# Patient Record
Sex: MALE
[De-identification: names, ages, dates, MRNs, and addresses within clinical notes are randomized per-mention and may not be internally consistent; named-entity substitution may affect disease eponyms.]

---
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
## 2019-03-04 ENCOUNTER — OFFICE VISIT (OUTPATIENT)
Dept: PRIMARY CARE CLINIC | Age: 19
End: 2019-03-04
Payer: COMMERCIAL

## 2019-03-04 VITALS
HEART RATE: 57 BPM | WEIGHT: 248 LBS | HEIGHT: 78 IN | SYSTOLIC BLOOD PRESSURE: 157 MMHG | DIASTOLIC BLOOD PRESSURE: 75 MMHG | BODY MASS INDEX: 28.69 KG/M2

## 2019-03-04 DIAGNOSIS — Z00.00 WELL ADULT EXAM: Primary | ICD-10-CM

## 2019-03-04 DIAGNOSIS — Z23 NEED FOR VACCINATION: ICD-10-CM

## 2019-03-04 DIAGNOSIS — S16.1XXA STRAIN OF NECK MUSCLE, INITIAL ENCOUNTER: ICD-10-CM

## 2019-03-04 DIAGNOSIS — R03.0 ELEVATED BLOOD PRESSURE READING WITHOUT DIAGNOSIS OF HYPERTENSION: ICD-10-CM

## 2019-03-04 PROCEDURE — 99385 PREV VISIT NEW AGE 18-39: CPT | Performed by: FAMILY MEDICINE

## 2019-03-04 SDOH — HEALTH STABILITY: MENTAL HEALTH: HOW MANY STANDARD DRINKS CONTAINING ALCOHOL DO YOU HAVE ON A TYPICAL DAY?: 5 OR 6

## 2019-03-04 SDOH — HEALTH STABILITY: PHYSICAL HEALTH: ON AVERAGE, HOW MANY DAYS PER WEEK DO YOU ENGAGE IN MODERATE TO STRENUOUS EXERCISE (LIKE A BRISK WALK)?: 4 DAYS

## 2019-03-04 SDOH — SOCIAL STABILITY: SOCIAL NETWORK: ARE YOU MARRIED, WIDOWED, DIVORCED, SEPARATED, NEVER MARRIED, OR LIVING WITH A PARTNER?: NEVER MARRIED

## 2019-03-04 SDOH — HEALTH STABILITY: PHYSICAL HEALTH: ON AVERAGE, HOW MANY MINUTES DO YOU ENGAGE IN EXERCISE AT THIS LEVEL?: 90 MIN

## 2019-03-04 SDOH — HEALTH STABILITY: MENTAL HEALTH: HOW OFTEN DO YOU HAVE A DRINK CONTAINING ALCOHOL?: 2-4 TIMES A MONTH

## 2019-03-04 ASSESSMENT — ENCOUNTER SYMPTOMS
COUGH: 0
ABDOMINAL PAIN: 0
RHINORRHEA: 0
CONSTIPATION: 0
VOMITING: 0
COLOR CHANGE: 0
NAUSEA: 0
EYE PAIN: 0
DIARRHEA: 0
SORE THROAT: 0
SHORTNESS OF BREATH: 0

## 2019-05-21 ENCOUNTER — OFFICE VISIT (OUTPATIENT)
Dept: PRIMARY CARE CLINIC | Age: 19
End: 2019-05-21
Payer: COMMERCIAL

## 2019-05-21 VITALS
DIASTOLIC BLOOD PRESSURE: 76 MMHG | HEIGHT: 78 IN | HEART RATE: 63 BPM | BODY MASS INDEX: 26.03 KG/M2 | WEIGHT: 225 LBS | SYSTOLIC BLOOD PRESSURE: 138 MMHG

## 2019-05-21 DIAGNOSIS — R53.83 FATIGUE, UNSPECIFIED TYPE: ICD-10-CM

## 2019-05-21 DIAGNOSIS — R63.4 WEIGHT LOSS: ICD-10-CM

## 2019-05-21 DIAGNOSIS — Z23 NEED FOR VACCINATION: ICD-10-CM

## 2019-05-21 DIAGNOSIS — R03.0 ELEVATED BLOOD PRESSURE READING IN OFFICE WITHOUT DIAGNOSIS OF HYPERTENSION: Primary | ICD-10-CM

## 2019-05-21 LAB
BILIRUBIN, POC: NEGATIVE
BLOOD URINE, POC: NEGATIVE
CLARITY, POC: NORMAL
COLOR, POC: NORMAL
GLUCOSE URINE, POC: NEGATIVE
KETONES, POC: NEGATIVE
LEUKOCYTE EST, POC: NEGATIVE
NITRITE, POC: NEGATIVE
PH, POC: 7
PROTEIN, POC: NEGATIVE
SPECIFIC GRAVITY, POC: 1.02
UROBILINOGEN, POC: 0.2

## 2019-05-21 PROCEDURE — 81002 URINALYSIS NONAUTO W/O SCOPE: CPT | Performed by: FAMILY MEDICINE

## 2019-05-21 PROCEDURE — 99214 OFFICE O/P EST MOD 30 MIN: CPT | Performed by: FAMILY MEDICINE

## 2019-05-21 ASSESSMENT — ENCOUNTER SYMPTOMS
COUGH: 0
CONSTIPATION: 0
ABDOMINAL PAIN: 0
NAUSEA: 0
SHORTNESS OF BREATH: 0
DIARRHEA: 0
VOMITING: 0

## 2019-05-21 ASSESSMENT — PATIENT HEALTH QUESTIONNAIRE - PHQ9
SUM OF ALL RESPONSES TO PHQ QUESTIONS 1-9: 0
2. FEELING DOWN, DEPRESSED OR HOPELESS: 0
1. LITTLE INTEREST OR PLEASURE IN DOING THINGS: 0
SUM OF ALL RESPONSES TO PHQ9 QUESTIONS 1 & 2: 0
SUM OF ALL RESPONSES TO PHQ QUESTIONS 1-9: 0

## 2019-05-21 NOTE — PROGRESS NOTES
Chief Complaint   Patient presents with    Other     Elevated blood pressure reading in office without diagnosis of hypertension    Fatigue    Other     Tightness in throat         HPI:  Dafne Lopez is a 19 y.o. (:2000) here today for multiple medical problems. He has a history of elevated blood pressure reading without diagnosis of hypertension. Chaparro complains of fatigue. Symptoms began several months ago. Sentinal symptom the patient feels fatigue began with: fever. Symptoms of his fatigue have been no other symptoms. He describes the following psychologic symptoms: no stresst that is out of the ordinary. He denies fever, significant change in weight and exercise intolerance. Symptoms have stabilized. Severity has been moderate but easily can carry out all school, work, and family activities. Previous visits for this problem: yes, last seen 2 months ago by me. He denies blood in stool. He says that this stool is brown in color. Review of Systems   Constitutional: Positive for fatigue. Negative for chills and fever. Respiratory: Negative for cough and shortness of breath. Cardiovascular: Negative for chest pain and palpitations. Gastrointestinal: Negative for abdominal pain, constipation, diarrhea, nausea and vomiting. Endocrine: Negative for polyuria. Genitourinary: Negative for dysuria. Neurological: Negative for dizziness and light-headedness. Hematological: Negative for adenopathy. Does not bruise/bleed easily. No past medical history on file.   Family History   Problem Relation Age of Onset    No Known Problems Mother     Hypertension Father     No Known Problems Brother     Other Brother         Heart Murmur     Social History     Socioeconomic History    Marital status: Single     Spouse name: Not on file    Number of children: 0    Years of education: Not on file    Highest education level: Not on file   Occupational History    Occupation: heard.  Pulmonary/Chest: Effort normal and breath sounds normal. He has no wheezes. He has no rales. Abdominal: Soft. Bowel sounds are normal. He exhibits no distension and no mass. There is no tenderness. Lymphadenopathy:     He has no cervical adenopathy. Skin: Skin is warm and dry. No rash noted. No results found for: LABA1C, LABMICR, LDLCALC    ASSESSMENT/PLAN:    1. Elevated blood pressure reading in office without diagnosis of hypertension  Improved: monitor with wt loss. 2. Fatigue, unspecified type  Concerning with wt loss, will check labs for underlying metabolic d/os  - Lipid Panel; Future  - Comprehensive Metabolic Panel; Future  - CBC Auto Differential; Future  - TSH with Reflex; Future  - Lilliam-Fry virus VCA, IgG; Future  - Lilliam-Barr virus nuclear antigen antibody, IgG; Future  - Lilliam-Barr virus early antigen antibody, IgG; Future    3. Need for vaccination  vaccinate  - HPV Vaccine 9-valent IM    4. Weight loss  Intentional but exceeds expectations, check labs. - Comprehensive Metabolic Panel; Future  - CBC Auto Differential; Future  - TSH with Reflex; Future  - POCT Urinalysis no Micro      RTC pending results. Scribe attestation:  Gladis Velázquez MA, am scribing for and in the presence of Torito Loza MD. Electronically signed by Rut Liu MA on 5/21/2019 at 2:18 PM            Provider attestation: Hilario Maciel MD, personally performed the services scribed by the user listed above in my presence, and it is both accurate and complete. I agree with the ROS and Past Histories independently gathered by the clinical support staff and the remaining scribed note accurately describes my personal service to the patient.     [facility]    5/21/2019  2:58 PM

## 2019-05-22 PROCEDURE — 90471 IMMUNIZATION ADMIN: CPT | Performed by: FAMILY MEDICINE

## 2019-05-22 PROCEDURE — 90651 9VHPV VACCINE 2/3 DOSE IM: CPT | Performed by: FAMILY MEDICINE

## 2019-06-06 DIAGNOSIS — R53.83 FATIGUE, UNSPECIFIED TYPE: ICD-10-CM

## 2019-06-06 DIAGNOSIS — R63.4 WEIGHT LOSS: ICD-10-CM

## 2019-06-07 LAB
A/G RATIO: 2.6 (ref 1.1–2.2)
ALBUMIN SERPL-MCNC: 5.1 G/DL (ref 3.4–5)
ALP BLD-CCNC: 62 U/L (ref 40–129)
ALT SERPL-CCNC: 33 U/L (ref 10–40)
ANION GAP SERPL CALCULATED.3IONS-SCNC: 15 MMOL/L (ref 3–16)
AST SERPL-CCNC: 21 U/L (ref 15–37)
BASOPHILS ABSOLUTE: 0 K/UL (ref 0–0.2)
BASOPHILS RELATIVE PERCENT: 0.6 %
BILIRUB SERPL-MCNC: 0.5 MG/DL (ref 0–1)
BUN BLDV-MCNC: 13 MG/DL (ref 7–20)
CALCIUM SERPL-MCNC: 9.7 MG/DL (ref 8.3–10.6)
CHLORIDE BLD-SCNC: 103 MMOL/L (ref 99–110)
CHOLESTEROL, TOTAL: 122 MG/DL (ref 0–199)
CO2: 25 MMOL/L (ref 21–32)
CREAT SERPL-MCNC: 0.9 MG/DL (ref 0.9–1.3)
EOSINOPHILS ABSOLUTE: 0.3 K/UL (ref 0–0.6)
EOSINOPHILS RELATIVE PERCENT: 4.2 %
GFR AFRICAN AMERICAN: >60
GFR NON-AFRICAN AMERICAN: >60
GLOBULIN: 2 G/DL
GLUCOSE BLD-MCNC: 96 MG/DL (ref 70–99)
HCT VFR BLD CALC: 44.1 % (ref 40.5–52.5)
HDLC SERPL-MCNC: 48 MG/DL (ref 40–60)
HEMOGLOBIN: 14.7 G/DL (ref 13.5–17.5)
LDL CHOLESTEROL CALCULATED: 50 MG/DL
LYMPHOCYTES ABSOLUTE: 2.1 K/UL (ref 1–5.1)
LYMPHOCYTES RELATIVE PERCENT: 32.1 %
MCH RBC QN AUTO: 29.4 PG (ref 26–34)
MCHC RBC AUTO-ENTMCNC: 33.4 G/DL (ref 31–36)
MCV RBC AUTO: 88.1 FL (ref 80–100)
MONOCYTES ABSOLUTE: 0.4 K/UL (ref 0–1.3)
MONOCYTES RELATIVE PERCENT: 6.5 %
NEUTROPHILS ABSOLUTE: 3.6 K/UL (ref 1.7–7.7)
NEUTROPHILS RELATIVE PERCENT: 56.6 %
PDW BLD-RTO: 14 % (ref 12.4–15.4)
PLATELET # BLD: 185 K/UL (ref 135–450)
PMV BLD AUTO: 10.9 FL (ref 5–10.5)
POTASSIUM SERPL-SCNC: 4.7 MMOL/L (ref 3.5–5.1)
RBC # BLD: 5.01 M/UL (ref 4.2–5.9)
SODIUM BLD-SCNC: 143 MMOL/L (ref 136–145)
TOTAL PROTEIN: 7.1 G/DL (ref 6.4–8.2)
TRIGL SERPL-MCNC: 122 MG/DL (ref 0–150)
TSH REFLEX: 2.08 UIU/ML (ref 0.27–4.2)
VLDLC SERPL CALC-MCNC: 24 MG/DL
WBC # BLD: 6.4 K/UL (ref 4–11)

## 2019-06-08 LAB
EPSTEIN BARR VIRUS NUCLEAR AB IGG: <3 U/ML (ref 0–21.9)
EPSTEIN-BARR VCA IGG: <10 U/ML (ref 0–21.9)

## 2020-10-12 ENCOUNTER — HOSPITAL ENCOUNTER (OUTPATIENT)
Dept: GENERAL RADIOLOGY | Age: 20
Discharge: HOME | End: 2020-10-12
Payer: COMMERCIAL

## 2020-10-12 ENCOUNTER — OFFICE VISIT (OUTPATIENT)
Dept: PRIMARY CARE CLINIC | Age: 20
End: 2020-10-12
Payer: COMMERCIAL

## 2020-10-12 ENCOUNTER — HOSPITAL ENCOUNTER (OUTPATIENT)
Age: 20
Discharge: HOME | End: 2020-10-12
Payer: COMMERCIAL

## 2020-10-12 VITALS
BODY MASS INDEX: 27.6 KG/M2 | WEIGHT: 238.8 LBS | SYSTOLIC BLOOD PRESSURE: 126 MMHG | TEMPERATURE: 93.7 F | HEART RATE: 58 BPM | DIASTOLIC BLOOD PRESSURE: 68 MMHG

## 2020-10-12 PROCEDURE — 73552 X-RAY EXAM OF FEMUR 2/>: CPT

## 2020-10-12 PROCEDURE — 99213 OFFICE O/P EST LOW 20 MIN: CPT | Performed by: FAMILY MEDICINE

## 2020-10-12 ASSESSMENT — ENCOUNTER SYMPTOMS
NAUSEA: 0
CONSTIPATION: 0
ABDOMINAL PAIN: 0
SHORTNESS OF BREATH: 0
VOMITING: 0
DIARRHEA: 0
COUGH: 0

## 2020-10-12 ASSESSMENT — PATIENT HEALTH QUESTIONNAIRE - PHQ9
SUM OF ALL RESPONSES TO PHQ9 QUESTIONS 1 & 2: 0
SUM OF ALL RESPONSES TO PHQ QUESTIONS 1-9: 0
2. FEELING DOWN, DEPRESSED OR HOPELESS: 0
1. LITTLE INTEREST OR PLEASURE IN DOING THINGS: 0
SUM OF ALL RESPONSES TO PHQ QUESTIONS 1-9: 0

## 2020-10-12 NOTE — PROGRESS NOTES
Chief Complaint   Patient presents with    Leg Pain     right thigh, x 1 month       HPI: Kuldip Jefferson presents for evaluation and management of 1 month history of dull aching and throbbing distal right femur pain. He is not able associated with an acute injury but he had been lifting a lot of weights. He notes no bruising and no other symptoms of illness. Denies fever chills easy bleeding or bruising      Review of Systems   Constitutional: Negative for chills and fever. Respiratory: Negative for cough and shortness of breath. Cardiovascular: Negative for chest pain and palpitations. Gastrointestinal: Negative for abdominal pain, constipation, diarrhea, nausea and vomiting. Endocrine: Negative for polyuria. Genitourinary: Negative for dysuria. No Known Allergies  New Prescriptions    No medications on file     No current outpatient medications on file. No current facility-administered medications for this visit. History reviewed. No pertinent past medical history. Objective   /68   Pulse 58   Temp 93.7 °F (34.3 °C) (Temporal)   Wt 238 lb 12.8 oz (108.3 kg)   BMI 27.60 kg/m²   Wt Readings from Last 3 Encounters:   10/12/20 238 lb 12.8 oz (108.3 kg)   05/21/19 225 lb (102.1 kg)   03/04/19 248 lb (112.5 kg)       Physical Exam  Constitutional:       Appearance: He is well-developed. Cardiovascular:      Rate and Rhythm: Normal rate and regular rhythm. Heart sounds: No murmur. No friction rub. No gallop. Pulmonary:      Effort: Pulmonary effort is normal.      Breath sounds: Normal breath sounds. No wheezing or rales. Abdominal:      General: There is no distension. Palpations: Abdomen is soft. Musculoskeletal:      Comments: No bony tenderness or swelling noted of right femur   Skin:     General: Skin is warm and dry. Findings: No rash.            Chemistry        Component Value Date/Time     06/06/2019 1158    K 4.7 06/06/2019 1158     06/06/2019 1158    CO2 25 06/06/2019 1158    BUN 13 06/06/2019 1158    CREATININE 0.9 06/06/2019 1158        Component Value Date/Time    CALCIUM 9.7 06/06/2019 1158    ALKPHOS 62 06/06/2019 1158    AST 21 06/06/2019 1158    ALT 33 06/06/2019 1158    BILITOT 0.5 06/06/2019 1158          Lab Results   Component Value Date    WBC 6.4 06/06/2019    HGB 14.7 06/06/2019    HCT 44.1 06/06/2019    MCV 88.1 06/06/2019     06/06/2019     No results found for: LABA1C  No results found for: EAG  No results found for: LABA1C  No components found for: CHLPL  Lab Results   Component Value Date    TRIG 122 06/06/2019     Lab Results   Component Value Date    HDL 48 06/06/2019     Lab Results   Component Value Date    LDLCALC 50 06/06/2019     Lab Results   Component Value Date    LABVLDL 24 06/06/2019         Assessment   Plan     1. Bone pain  Concerning for underlying bone pathology. We will start with an x-ray if this is abnormal we will obtain an MRI. And laboratory w/u for malignancy (CMP, ESR, CBC, SPEP, Light Chain)  - XR FEMUR RIGHT (MIN 2 VIEWS); Future    Discussed use, benefit, and side effects of prescribed medications. Barriers to medication compliance addressed. All patient questions answered. Pt voiced understanding.        Health Maintenance   Topic Date Due    DTaP/Tdap/Td vaccine (6 - Tdap) 07/24/2009    HIV screen  07/24/2013    HPV vaccine (3 - Male 3-dose series) 09/22/2019    Flu vaccine (1) 09/01/2020    Hepatitis A vaccine  Completed    Hepatitis B vaccine  Completed    Hib vaccine  Completed    Varicella vaccine  Completed    Meningococcal (ACWY) vaccine  Completed    Pneumococcal 0-64 years Vaccine  Completed       RTC 2 weeks and as needed

## 2021-07-08 ENCOUNTER — OFFICE VISIT (OUTPATIENT)
Dept: PRIMARY CARE CLINIC | Age: 21
End: 2021-07-08
Payer: COMMERCIAL

## 2021-07-08 VITALS
HEART RATE: 62 BPM | HEIGHT: 78 IN | WEIGHT: 227.6 LBS | BODY MASS INDEX: 26.33 KG/M2 | SYSTOLIC BLOOD PRESSURE: 120 MMHG | DIASTOLIC BLOOD PRESSURE: 71 MMHG

## 2021-07-08 DIAGNOSIS — D48.5 NEOPLASM OF UNCERTAIN BEHAVIOR OF SKIN: ICD-10-CM

## 2021-07-08 DIAGNOSIS — M54.9 MID BACK PAIN ON LEFT SIDE: Primary | ICD-10-CM

## 2021-07-08 PROCEDURE — 99214 OFFICE O/P EST MOD 30 MIN: CPT | Performed by: FAMILY MEDICINE

## 2021-07-08 RX ORDER — CYCLOBENZAPRINE HCL 10 MG
10 TABLET ORAL 3 TIMES DAILY PRN
Qty: 20 TABLET | Refills: 0 | Status: SHIPPED | OUTPATIENT
Start: 2021-07-08 | End: 2021-07-18

## 2021-07-08 RX ORDER — METHYLPREDNISOLONE 4 MG/1
TABLET ORAL
Qty: 1 KIT | Refills: 0 | Status: SHIPPED | OUTPATIENT
Start: 2021-07-08 | End: 2021-12-21

## 2021-07-08 ASSESSMENT — PATIENT HEALTH QUESTIONNAIRE - PHQ9
SUM OF ALL RESPONSES TO PHQ QUESTIONS 1-9: 0
1. LITTLE INTEREST OR PLEASURE IN DOING THINGS: 0
SUM OF ALL RESPONSES TO PHQ9 QUESTIONS 1 & 2: 0
2. FEELING DOWN, DEPRESSED OR HOPELESS: 0
SUM OF ALL RESPONSES TO PHQ QUESTIONS 1-9: 0
SUM OF ALL RESPONSES TO PHQ QUESTIONS 1-9: 0

## 2021-07-08 NOTE — PATIENT INSTRUCTIONS
to 12 times. Resisted shoulder blade squeeze   For this exercise, you will need elastic exercise material, such as surgical tubing or Thera-Band. 1. Sit or stand, holding the band in both hands in front of you. Keep your elbows close to your sides, bent at a 90-degree angle. Your palms should face up. 2. Squeeze your shoulder blades together, and move your arms to the outside, stretching the band. Be sure to keep your elbows at your sides while you do this. 3. Relax. 4. Repeat 8 to 12 times. Resisted rows   For this exercise, you will need elastic exercise material, such as surgical tubing or Thera-Band. 1. Put the band around a solid object, such as a bedpost, at about waist level. Hold one end of the band in each hand. 2. With your elbows at your sides and bent to 90 degrees, pull the band back to move your shoulder blades toward each other. Return to the starting position. 3. Repeat 8 to 12 times. Follow-up care is a key part of your treatment and safety. Be sure to make and go to all appointments, and call your doctor if you are having problems. It's also a good idea to know your test results and keep a list of the medicines you take. Where can you learn more? Go to https://[employer]. org and sign in to your Medicine in Practice account. Enter V588 in the [facility] box to learn more about \"Healthy Upper Back: Exercises. \"     If you do not have an account, please click on the \"Sign Up Now\" link. Current as of: November 16, 2020               Content Version: 12.9  © 8554-6424 [employer]wise, Incorporated. Care instructions adapted under license by [facility] ([facility]). If you have questions about a medical condition or this instruction, always ask your healthcare professional. [address] any warranty or liability for your use of this information.

## 2021-07-08 NOTE — PROGRESS NOTES
Chief Complaint   Patient presents with    Back Pain     Upper back        HPI: Miriam Wills presents for evaluation and management of 2-week history of mid left back pain. Patient denies any antecedent history of injury. He has tried resting it and icing it and massage without any benefit. He also notes a 1 year history of pigmented lesion on the left side of his back that he thinks is gradually enlarging. His parents have asked him to have that evaluated as well. Today's PHQ:    PHQ Scores 7/8/2021 10/12/2020 5/21/2019   PHQ2 Score 0 0 0   PHQ9 Score 0 0 0     Interpretation of Total Score Depression Severity: 1-4 = Minimal depression, 5-9 = Mild depression, 10-14 = Moderate depression, 15-19 = Moderately severe depression, 20-27 = Severe depression        Review of Systems    No Known Allergies  New Prescriptions    CYCLOBENZAPRINE (FLEXERIL) 10 MG TABLET    Take 1 tablet by mouth 3 times daily as needed for Muscle spasms best if taken one hour before bedtime then Ice your lower back for 20 min    METHYLPREDNISOLONE (MEDROL DOSEPACK) 4 MG TABLET    Take by mouth. Current Outpatient Medications   Medication Sig Dispense Refill    cyclobenzaprine (FLEXERIL) 10 MG tablet Take 1 tablet by mouth 3 times daily as needed for Muscle spasms best if taken one hour before bedtime then Ice your lower back for 20 min 20 tablet 0    methylPREDNISolone (MEDROL DOSEPACK) 4 MG tablet Take by mouth. 1 kit 0     No current facility-administered medications for this visit. No past medical history on file. Objective   /71 (Site: Left Upper Arm, Position: Sitting, Cuff Size: Large Adult)   Pulse 62   Ht 6' 6\" (1.981 m)   Wt 227 lb 9.6 oz (103.2 kg)   BMI 26.30 kg/m²   Wt Readings from Last 3 Encounters:   07/08/21 227 lb 9.6 oz (103.2 kg)   10/12/20 238 lb 12.8 oz (108.3 kg)   05/21/19 225 lb (102.1 kg)       Physical Exam  Constitutional:       Appearance: He is well-developed.    Cardiovascular:      Rate and Rhythm: Normal rate and regular rhythm. Heart sounds: No murmur heard. No friction rub. No gallop. Pulmonary:      Effort: Pulmonary effort is normal.      Breath sounds: Normal breath sounds. No wheezing or rales. Abdominal:      General: Bowel sounds are normal. There is no distension. Palpations: Abdomen is soft. There is no mass. Tenderness: There is no abdominal tenderness. Musculoskeletal:      Comments: Mild left paraspinal tenderness and spasm of mid thoracic back   Skin:     General: Skin is warm and dry. Findings: No rash. Comments: Irregular light brown pigmented lesion in left mid back. Chemistry        Component Value Date/Time     06/06/2019 1158    K 4.7 06/06/2019 1158     06/06/2019 1158    CO2 25 06/06/2019 1158    BUN 13 06/06/2019 1158    CREATININE 0.9 06/06/2019 1158        Component Value Date/Time    CALCIUM 9.7 06/06/2019 1158    ALKPHOS 62 06/06/2019 1158    AST 21 06/06/2019 1158    ALT 33 06/06/2019 1158    BILITOT 0.5 06/06/2019 1158          Lab Results   Component Value Date    WBC 6.4 06/06/2019    HGB 14.7 06/06/2019    HCT 44.1 06/06/2019    MCV 88.1 06/06/2019     06/06/2019     No results found for: LABA1C  No results found for: EAG  No results found for: LABA1C  No components found for: CHLPL  Lab Results   Component Value Date    TRIG 122 06/06/2019     Lab Results   Component Value Date    HDL 48 06/06/2019     Lab Results   Component Value Date    LDLCALC 50 06/06/2019     Lab Results   Component Value Date    LABVLDL 24 06/06/2019         Assessment   Plan   1. Mid back pain on left side  Gave patient steroids and muscle relaxants and a home exercise program we will recheck this in 1 month-     cyclobenzaprine (FLEXERIL) 10 MG tablet;  Take 1 tablet by mouth 3 times daily as needed for Muscle spasms best if taken one hour before bedtime then Ice your lower back for 20 min, Disp-20 tablet, R-0Normal  - methylPREDNISolone (MEDROL DOSEPACK) 4 MG tablet; Take by mouth., Disp-1 kit, R-0Normal  2. Neoplasm of uncertain behavior of skin  Consult to Dr. Cholo Elizondo for evaluation.  -     Kosta Lujan MD, Dermatology, [facility]     Discussed use, benefit, and side effects of prescribed medications. Barriers to medication compliance addressed. All patient questions answered. Pt voiced understanding. RTC Return in about 4 weeks (around 8/5/2021).

## 2021-07-08 NOTE — Clinical Note
Dr. Paula Patricia,   Would you mind taking a look at a skin lesion on Chaparro's L mid back for me? It is brown and flat but irregular and he is worried and I can't give him a specific diagnosis. Thanks,  Angelene Lennox told him your office would call him.

## 2021-07-09 ENCOUNTER — TELEPHONE (OUTPATIENT)
Dept: DERMATOLOGY | Age: 21
End: 2021-07-09

## 2021-07-09 ENCOUNTER — OFFICE VISIT (OUTPATIENT)
Dept: DERMATOLOGY | Age: 21
End: 2021-07-09
Payer: COMMERCIAL

## 2021-07-09 VITALS — TEMPERATURE: 98.1 F

## 2021-07-09 DIAGNOSIS — D22.5: Primary | ICD-10-CM

## 2021-07-09 DIAGNOSIS — L90.5 SCAR CONDITION AND FIBROSIS OF SKIN: ICD-10-CM

## 2021-07-09 DIAGNOSIS — D22.9 MULTIPLE BENIGN MELANOCYTIC NEVI: ICD-10-CM

## 2021-07-09 PROCEDURE — 99243 OFF/OP CNSLTJ NEW/EST LOW 30: CPT | Performed by: DERMATOLOGY

## 2021-07-09 NOTE — Clinical Note
Dear Dr. Ed Solis evaluated Mukesh Cruz who is a very nice gentleman. I reassured him the lesion is called Holman's nevus (benign hamartoma). In his case it is also associated with a smooth muscle hamartoma. No treatment is necessary.     Thank you so much for the referral!    Aliyah Loyd

## 2021-07-09 NOTE — PROGRESS NOTES
Patient's Name: Mukesh Cruz  MRN: <[id number]>  YOB: 2000  Date of Visit: 7/9/2021  Primary Care Provider: Samreen Butcher MD  Referring Provider: Samreen Butcher MD    Subjective:     Chief Complaint   Patient presents with   [de-identified] Mole     left mid back        History of Present Illness:  Mukesh Cruz an 23 y.o. male who presents in clinic today as a new patient seen in consultation request by Samreen Butcher MD   for a  skin examination and evaluation of a mole . He is concerned about the following spot/s that he would like checked:    What is it: Mole  Signs and symptoms: irritated  Location: Left back  . Duration: This lesion has been present for (unsure how long but was noted by his family ~4 months ago)  Current treatment: None  He is being treated for back pain by his PCP (medrol dose pack and NSAIDs)        Past Dermatologic History:  Personal history of non melanoma skin cancer: No   Personal history of melanoma: No  Personal history of abnormal/dysplastic moles: No  Personal history of tanning bed use current: No  Personal history of tanning bed use: No  Personal history of blistering sunburns: No  Personal history of extensive sun exposure: Yes  Burns easily: Yes  Practicing sun protective habits:  No     Social History:   Occupation Current or Former: full time job as    Marital status: single  Smoking Status: Never smoker  Children: No   Type of outdoor activities if any : Running, hiking, fishing      Family Skin Disease History:  Patient denies  a family history of non melanoma skin cancer. Patient denies  a family history of abnormal moles  Patient denies  an immediate family history of melanoma. Past Medical History:  History reviewed. No pertinent past medical history.     Past Surgical History:  Past Surgical History:   Procedure Laterality Date    HUMERUS FRACTURE SURGERY Right     2 Times    WISDOM TOOTH EXTRACTION  2018       Past Family History:  Family History   Problem Relation Age of Onset    No Known Problems Mother     Hypertension Father     No Known Problems Brother     Other Brother         Heart Murmur       Allergies:  No Known Allergies    Current Medications:  Current Outpatient Medications   Medication Sig Dispense Refill    cyclobenzaprine (FLEXERIL) 10 MG tablet Take 1 tablet by mouth 3 times daily as needed for Muscle spasms best if taken one hour before bedtime then Ice your lower back for 20 min 20 tablet 0    methylPREDNISolone (MEDROL DOSEPACK) 4 MG tablet Take by mouth. 1 kit 0     No current facility-administered medications for this visit. Review of Systems:  Constitutional: No fevers, chills or recent illness. Skin: Skin:As per HPI AND otherwise no new, bleeding or symptomatic skin lesions      Objective:     Vitals:    07/09/21 1239   Temp: 98.1 °F (36.7 °C)   TempSrc: Infrared     Physical Examination:  General: alert, comfortable, no apparent distress, well-appearing  Psych: alert, oriented and pleasant  Neuro: oriented to person, place, and time  Skin: Areas examined: head including face, lips, conjunctiva and lids, neck, hair/scalp, chest, including breasts and axilla, abdomen, back, right upper extremity, left upper extremity, left hand, right hand and digits and nails      All areas examined were within normal limits except those listed below with the appropriate assessment and plan    Assessment and Plan (with relevant objective exam findings):     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 This typically presents as a large, hyperpigmented patch with increased hair growth on the upper trunk in men.  Lesions are usually present before puberty but often have increased coarse hair growth and a slightly raised texture during adolescence due to androgen stimulation. Acne may develop in the lesions. Treatment is only required in rare cases for severe cosmetic or psychosocial reasons. Reassurance provided and no active intervention at this time    2. Multiple benign melanocytic nevi   Location:torso and extremities  Objective findings: multiple brown/pink macules and papules without abnormal findings or concerning findings on exam and dermatoscopy    -Counseled the patient that these are benign and need no therapy. Observation for any changes recommended       3. Scar conditions and fibrosis of skin  Location: Rt upper arm  Objective findings: hypertrophic scar    Counseled the patient that this is benign and needs no therapy. Observation for any changes recommended. Risk factors for conditions such as nevi, and skin damage including genetics and UV radiation from the sun was discussed. Recommended sun avoidance, use of protective clothing and broad spectrum sunscreen containing avobenzone, titanium dioxide, or zinc oxide with SPF 50 or higher and yearly full skin exams with a dermatologist.  Encouraged self skin checks each month and return to clinic if any new, changing, or concerning spots are identified.  - Written material on sunscreen provided. Follow up:  Return visit as needed for change in condition. All questions addressed. Procedure:   No procedure performed                I saw your patient Daren Mcginnis at the date listed above in my Dermatology  clinic in [facility]. Thank you very much for the referral.    My exam findings, assessment and plan can be found in EPIC, I have also attached them below for your convenience. I very much appreciate your referral and the opportunity to participate in this patient's care. Please don't hesitate to contact me with questions or concerns about our visit or management of this patient in the future.      Karen Guthrie MD, MS

## 2021-07-09 NOTE — TELEPHONE ENCOUNTER
Patient is returning Percy's call re appt for 12:40 today. Patient states he can make that appt please call to confirm and schedule. Please advise. Thank you!

## 2021-07-11 PROBLEM — D22.9 MULTIPLE BENIGN MELANOCYTIC NEVI: Status: ACTIVE | Noted: 2021-07-11

## 2021-07-11 PROBLEM — D22.5: Status: ACTIVE | Noted: 2021-07-11

## 2021-07-12 NOTE — PATIENT INSTRUCTIONS
A Holman nevus is a late-onset birthmark occurring mostly in males. It is also known as Holman melanosis. It is due to an overgrowth of the epidermis (upper layers of the skin), pigment cells (melanocytes) and hair follicles. It develops during childhood or adolescence on the shoulders or upper trunk, occasionally elsewhere. It may be triggered to develop by circulating androgens (male hormones such as testosterone), which is why it appears in males at puberty. Features of Holman nevus  A Holman nevus is a large one-sided brown patch, sometimes over half the upper back or chest. After puberty it often becomes darker and quite hairy, a feature also called hypertrichosis. Occasionally acne may develop in the nevus. Treatment with lasers if treatment is requested for for the hair overgrowth or dark color.

## 2021-12-21 ENCOUNTER — OFFICE VISIT (OUTPATIENT)
Dept: PRIMARY CARE CLINIC | Age: 21
End: 2021-12-21
Payer: COMMERCIAL

## 2021-12-21 VITALS
SYSTOLIC BLOOD PRESSURE: 152 MMHG | BODY MASS INDEX: 29.32 KG/M2 | TEMPERATURE: 97.5 F | WEIGHT: 253.4 LBS | HEIGHT: 78 IN | DIASTOLIC BLOOD PRESSURE: 60 MMHG

## 2021-12-21 DIAGNOSIS — R03.0 ELEVATED BLOOD PRESSURE READING IN OFFICE WITHOUT DIAGNOSIS OF HYPERTENSION: Primary | ICD-10-CM

## 2021-12-21 DIAGNOSIS — S39.012A STRAIN OF LUMBAR REGION, INITIAL ENCOUNTER: ICD-10-CM

## 2021-12-21 PROCEDURE — 99214 OFFICE O/P EST MOD 30 MIN: CPT | Performed by: FAMILY MEDICINE

## 2021-12-21 RX ORDER — METHYLPREDNISOLONE 4 MG/1
TABLET ORAL
Qty: 1 KIT | Refills: 0 | Status: SHIPPED | OUTPATIENT
Start: 2021-12-21 | End: 2022-03-21 | Stop reason: ALTCHOICE

## 2021-12-21 RX ORDER — CYCLOBENZAPRINE HCL 10 MG
10 TABLET ORAL 3 TIMES DAILY PRN
Qty: 20 TABLET | Refills: 0 | Status: SHIPPED | OUTPATIENT
Start: 2021-12-21 | End: 2021-12-31

## 2021-12-21 ASSESSMENT — PATIENT HEALTH QUESTIONNAIRE - PHQ9
1. LITTLE INTEREST OR PLEASURE IN DOING THINGS: 0
SUM OF ALL RESPONSES TO PHQ9 QUESTIONS 1 & 2: 0
SUM OF ALL RESPONSES TO PHQ QUESTIONS 1-9: 0
2. FEELING DOWN, DEPRESSED OR HOPELESS: 0

## 2021-12-21 NOTE — PATIENT INSTRUCTIONS
Patient Education        DASH Diet: Care Instructions  Your Care Instructions     The DASH diet is an eating plan that can help lower your blood pressure. DASH stands for Dietary Approaches to Stop Hypertension. Hypertension is high blood pressure. The DASH diet focuses on eating foods that are high in calcium, potassium, and magnesium. These nutrients can lower blood pressure. The foods that are highest in these nutrients are fruits, vegetables, low-fat dairy products, nuts, seeds, and legumes. But taking calcium, potassium, and magnesium supplements instead of eating foods that are high in those nutrients does not have the same effect. The DASH diet also includes whole grains, fish, and poultry. The DASH diet is one of several lifestyle changes your doctor may recommend to lower your high blood pressure. Your doctor may also want you to decrease the amount of sodium in your diet. Lowering sodium while following the DASH diet can lower blood pressure even further than just the DASH diet alone. Follow-up care is a key part of your treatment and safety. Be sure to make and go to all appointments, and call your doctor if you are having problems. It's also a good idea to know your test results and keep a list of the medicines you take. How can you care for yourself at home? Following the DASH diet  · Eat 4 to 5 servings of fruit each day. A serving is 1 medium-sized piece of fruit, ½ cup chopped or canned fruit, 1/4 cup dried fruit, or 4 ounces (½ cup) of fruit juice. Choose fruit more often than fruit juice. · Eat 4 to 5 servings of vegetables each day. A serving is 1 cup of lettuce or raw leafy vegetables, ½ cup of chopped or cooked vegetables, or 4 ounces (½ cup) of vegetable juice. Choose vegetables more often than vegetable juice. · Get 2 to 3 servings of low-fat and fat-free dairy each day. A serving is 8 ounces of milk, 1 cup of yogurt, or 1 ½ ounces of cheese. · Eat 6 to 8 servings of grains each day. A serving is 1 slice of bread, 1 ounce of dry cereal, or ½ cup of cooked rice, pasta, or cooked cereal. Try to choose whole-grain products as much as possible. · Limit lean meat, poultry, and fish to 2 servings each day. A serving is 3 ounces, about the size of a deck of cards. · Eat 4 to 5 servings of nuts, seeds, and legumes (cooked dried beans, lentils, and split peas) each week. A serving is 1/3 cup of nuts, 2 tablespoons of seeds, or ½ cup of cooked beans or peas. · Limit fats and oils to 2 to 3 servings each day. A serving is 1 teaspoon of vegetable oil or 2 tablespoons of salad dressing. · Limit sweets and added sugars to 5 servings or less a week. A serving is 1 tablespoon jelly or jam, ½ cup sorbet, or 1 cup of lemonade. · Eat less than 2,300 milligrams (mg) of sodium a day. If you limit your sodium to 1,500 mg a day, you can lower your blood pressure even more. · Be aware that all of these are the suggested number of servings for people who eat 1,800 to 2,000 calories a day. Your recommended number of servings may be different if you need more or fewer calories. Tips for success  · Start small. Do not try to make dramatic changes to your diet all at once. You might feel that you are missing out on your favorite foods and then be more likely to not follow the plan. Make small changes, and stick with them. Once those changes become habit, add a few more changes. · Try some of the following:  ? Make it a goal to eat a fruit or vegetable at every meal and at snacks. This will make it easy to get the recommended amount of fruits and vegetables each day. ? Try yogurt topped with fruit and nuts for a snack or healthy dessert. ? Add lettuce, tomato, cucumber, and onion to sandwiches. ? Combine a ready-made pizza crust with low-fat mozzarella cheese and lots of vegetable toppings. Try using tomatoes, squash, spinach, broccoli, carrots, cauliflower, and onions. ?  Have a variety of cut-up vegetables with a low-fat dip as an appetizer instead of chips and dip. ? Sprinkle sunflower seeds or chopped almonds over salads. Or try adding chopped walnuts or almonds to cooked vegetables. ? Try some vegetarian meals using beans and peas. Add garbanzo or kidney beans to salads. Make burritos and tacos with mashed amato beans or black beans. Where can you learn more? Go to https://FORMTEK."[employer]. org and sign in to your [employer] account. Enter M648 in the [employer] box to learn more about \"DASH Diet: Care Instructions. \"     If you do not have an account, please click on the \"Sign Up Now\" link. Current as of: April 29, 2021               Content Version: 13.0  © 7488-6290 [employer]. Care instructions adapted under license by [facility] ([facility]). If you have questions about a medical condition or this instruction, always ask your healthcare professional. [place] [address] any warranty or liability for your use of this information. Patient Education        Low Back Pain: Exercises  Introduction  Here are some examples of exercises for you to try. The exercises may be suggested for a condition or for rehabilitation. Start each exercise slowly. Ease off the exercises if you start to have pain. You will be told when to start these exercises and which ones will work best for you. How to do the exercises  Press-up    1. Lie on your stomach, supporting your body with your forearms. 2. Press your elbows down into the floor to raise your upper back. As you do this, relax your stomach muscles and allow your back to arch without using your back muscles. As your press up, do not let your hips or pelvis come off the floor. 3. Hold for 15 to 30 seconds, then relax. 4. Repeat 2 to 4 times. Alternate arm and leg (bird dog) exercise    Do this exercise slowly. Try to keep your body straight at all times, and do not let one hip drop lower than the other.   1. Start on the floor, on your hands and knees. 2. Tighten your belly muscles. 3. Raise one leg off the floor, and hold it straight out behind you. Be careful not to let your hip drop down, because that will twist your trunk. 4. Hold for about 6 seconds, then lower your leg and switch to the other leg. 5. Repeat 8 to 12 times on each leg. 6. Over time, work up to holding for 10 to 30 seconds each time. 7. If you feel stable and secure with your leg raised, try raising the opposite arm straight out in front of you at the same time. Knee-to-chest exercise    1. Lie on your back with your knees bent and your feet flat on the floor. 2. Bring one knee to your chest, keeping the other foot flat on the floor (or keeping the other leg straight, whichever feels better on your lower back). 3. Keep your lower back pressed to the floor. Hold for at least 15 to 30 seconds. 4. Relax, and lower the knee to the starting position. 5. Repeat with the other leg. Repeat 2 to 4 times with each leg. 6. To get more stretch, put your other leg flat on the floor while pulling your knee to your chest.  Curl-ups    1. Lie on the floor on your back with your knees bent at a 90-degree angle. Your feet should be flat on the floor, about 12 inches from your buttocks. 2. Cross your arms over your chest. If this bothers your neck, try putting your hands behind your neck (not your head), with your elbows spread apart. 3. Slowly tighten your belly muscles and raise your shoulder blades off the floor. 4. Keep your head in line with your body, and do not press your chin to your chest.  5. Hold this position for 1 or 2 seconds, then slowly lower yourself back down to the floor. 6. Repeat 8 to 12 times. Pelvic tilt exercise    1. Lie on your back with your knees bent. 2. \"Brace\" your stomach. This means to tighten your muscles by pulling in and imagining your belly button moving toward your spine.  You should feel like your back is pressing to the floor and your hips and pelvis are rocking back. 3. Hold for about 6 seconds while you breathe smoothly. 4. Repeat 8 to 12 times. Heel dig bridging    1. Lie on your back with both knees bent and your ankles bent so that only your heels are digging into the floor. Your knees should be bent about 90 degrees. 2. Then push your heels into the floor, squeeze your buttocks, and lift your hips off the floor until your shoulders, hips, and knees are all in a straight line. 3. Hold for about 6 seconds as you continue to breathe normally, and then slowly lower your hips back down to the floor and rest for up to 10 seconds. 4. Do 8 to 12 repetitions. Hamstring stretch in doorway    1. Lie on your back in a doorway, with one leg through the open door. 2. Slide your leg up the wall to straighten your knee. You should feel a gentle stretch down the back of your leg. 3. Hold the stretch for at least 15 to 30 seconds. Do not arch your back, point your toes, or bend either knee. Keep one heel touching the floor and the other heel touching the wall. 4. Repeat with your other leg. 5. Do 2 to 4 times for each leg. Hip flexor stretch    1. Kneel on the floor with one knee bent and one leg behind you. Place your forward knee over your foot. Keep your other knee touching the floor. 2. Slowly push your hips forward until you feel a stretch in the upper thigh of your rear leg. 3. Hold the stretch for at least 15 to 30 seconds. Repeat with your other leg. 4. Do 2 to 4 times on each side. Wall sit    1. Stand with your back 10 to 12 inches away from a wall. 2. Lean into the wall until your back is flat against it. 3. Slowly slide down until your knees are slightly bent, pressing your lower back into the wall. 4. Hold for about 6 seconds, then slide back up the wall. 5. Repeat 8 to 12 times. Follow-up care is a key part of your treatment and safety.  Be sure to make and go to all appointments, and call your doctor if you are having problems. It's also a good idea to know your test results and keep a list of the medicines you take. Where can you learn more? Go to https://chpepiceweb.[employer]. org and sign in to your [employer] account. Enter K793 in the [employer] box to learn more about \"Low Back Pain: Exercises. \"     If you do not have an account, please click on the \"Sign Up Now\" link. Current as of: July 1, 2021               Content Version: 13.0  © 2006-2021 [employer]. Care instructions adapted under license by [facility] ([facility]). If you have questions about a medical condition or this instruction, always ask your healthcare professional. [address] any warranty or liability for your use of this information.

## 2021-12-21 NOTE — PROGRESS NOTES
Chief Complaint   Patient presents with    Lower Back Pain     x 2 days ago,        HPI: Jose Blackwood  presents for evaluation and management of 2-day history of acute onset of low back pain that began while he was squatting 20 to 25 pounds which is usually a lighter weight for him. He notes his back popped and he had sharp lower left-sided back pain. Denies pain radiating down his legs notes no weakness or numbness no bowel or bladder incontinence. Separately he reports his father has a history of high blood pressure. Today's PHQ:    PHQ Scores 12/21/2021 7/8/2021 10/12/2020 5/21/2019   PHQ2 Score 0 0 0 0   PHQ9 Score 0 0 0 0     Interpretation of Total Score Depression Severity: 1-4 = Minimal depression, 5-9 = Mild depression, 10-14 = Moderate depression, 15-19 = Moderately severe depression, 20-27 = Severe depression        Review of Systems    No Known Allergies  New Prescriptions    No medications on file     Current Outpatient Medications   Medication Sig Dispense Refill    methylPREDNISolone (MEDROL DOSEPACK) 4 MG tablet Take by mouth. (Patient not taking: Reported on 12/21/2021) 1 kit 0     No current facility-administered medications for this visit. No past medical history on file. Objective   BP (!) 140/61   Temp 97.5 °F (36.4 °C)   Ht 6' 6\" (1.981 m)   Wt 253 lb 6.4 oz (114.9 kg)   BMI 29.28 kg/m²   Wt Readings from Last 3 Encounters:   12/21/21 253 lb 6.4 oz (114.9 kg)   07/08/21 227 lb 9.6 oz (103.2 kg)   10/12/20 238 lb 12.8 oz (108.3 kg)       Physical Exam  Constitutional:       Appearance: He is well-developed. Cardiovascular:      Rate and Rhythm: Normal rate and regular rhythm. Heart sounds: No murmur heard. No friction rub. No gallop. Pulmonary:      Effort: Pulmonary effort is normal.      Breath sounds: Normal breath sounds. No wheezing or rales. Abdominal:      General: Bowel sounds are normal. There is no distension. Palpations: Abdomen is soft.  There is no mass.      Tenderness: There is no abdominal tenderness. Musculoskeletal:      Comments: Straight leg raise negative bilaterally. Deep tendon reflexes are 2+ bilaterally and symmetric and lower extremities    Motor strength is 5 out of 5 bilateral lower extremities proximally distally. He does have some tenderness in midline of his lower back. Skin:     General: Skin is warm and dry. Findings: No rash. Chemistry        Component Value Date/Time     06/06/2019 1158    K 4.7 06/06/2019 1158     06/06/2019 1158    CO2 25 06/06/2019 1158    BUN 13 06/06/2019 1158    CREATININE 0.9 06/06/2019 1158        Component Value Date/Time    CALCIUM 9.7 06/06/2019 1158    ALKPHOS 62 06/06/2019 1158    AST 21 06/06/2019 1158    ALT 33 06/06/2019 1158    BILITOT 0.5 06/06/2019 1158          Lab Results   Component Value Date    WBC 6.4 06/06/2019    HGB 14.7 06/06/2019    HCT 44.1 06/06/2019    MCV 88.1 06/06/2019     06/06/2019     No results found for: LABA1C  No results found for: EAG  No results found for: LABA1C  No components found for: CHLPL  Lab Results   Component Value Date    TRIG 122 06/06/2019     Lab Results   Component Value Date    HDL 48 06/06/2019     Lab Results   Component Value Date    LDLCALC 50 06/06/2019     Lab Results   Component Value Date    LABVLDL 24 06/06/2019         Assessment   Plan   1. Elevated blood pressure reading in office without diagnosis of hypertension  Counseled patient on DASH diet. We will recheck in 1 month if blood pressures are still elevated we will add diagnosis of hypertension to his problem list and treat accordingly    2. Strain of lumbar region, initial encounter  Counseled patient on home exercise program to pursue. We will treat him with Medrol and muscle relaxants. If pain is worsening asked him to return to clinic sooner. Otherwise we will see him back in a month.   If pain is no better he may need physical therapy or sports medicine referral  - methylPREDNISolone (MEDROL DOSEPACK) 4 MG tablet; Take by mouth. Dispense: 1 kit; Refill: 0  - cyclobenzaprine (FLEXERIL) 10 MG tablet; Take 1 tablet by mouth 3 times daily as needed for Muscle spasms best if taken one hour before bedtime then Ice your lower back for 20 min  Dispense: 20 tablet; Refill: 0      [place] received counseling on the following healthy behaviors: nutrition and exercise    Patient given educational materials on Nutrition and Exercise    Discussed use, benefit, and side effects of prescribed medications. Barriers to medication compliance addressed. All patient questions answered. Pt voiced understanding. RTC Return in about 1 month (around 1/21/2022).

## 2022-01-21 ENCOUNTER — OFFICE VISIT (OUTPATIENT)
Dept: PRIMARY CARE CLINIC | Age: 22
End: 2022-01-21
Payer: COMMERCIAL

## 2022-01-21 VITALS
DIASTOLIC BLOOD PRESSURE: 71 MMHG | HEIGHT: 78 IN | HEART RATE: 55 BPM | TEMPERATURE: 96.4 F | SYSTOLIC BLOOD PRESSURE: 121 MMHG | BODY MASS INDEX: 28.79 KG/M2 | WEIGHT: 248.8 LBS

## 2022-01-21 DIAGNOSIS — S39.012A STRAIN OF LUMBAR REGION, INITIAL ENCOUNTER: Primary | ICD-10-CM

## 2022-01-21 DIAGNOSIS — R03.0 ELEVATED BLOOD PRESSURE READING IN OFFICE WITHOUT DIAGNOSIS OF HYPERTENSION: ICD-10-CM

## 2022-01-21 PROCEDURE — 99213 OFFICE O/P EST LOW 20 MIN: CPT | Performed by: FAMILY MEDICINE

## 2022-01-21 RX ORDER — AMOXICILLIN AND CLAVULANATE POTASSIUM 875; 125 MG/1; MG/1
TABLET, FILM COATED ORAL
COMMUNITY
Start: 2022-01-11 | End: 2022-03-21

## 2022-01-21 SDOH — ECONOMIC STABILITY: FOOD INSECURITY: WITHIN THE PAST 12 MONTHS, YOU WORRIED THAT YOUR FOOD WOULD RUN OUT BEFORE YOU GOT MONEY TO BUY MORE.: NEVER TRUE

## 2022-01-21 SDOH — ECONOMIC STABILITY: FOOD INSECURITY: WITHIN THE PAST 12 MONTHS, THE FOOD YOU BOUGHT JUST DIDN'T LAST AND YOU DIDN'T HAVE MONEY TO GET MORE.: NEVER TRUE

## 2022-01-21 ASSESSMENT — SOCIAL DETERMINANTS OF HEALTH (SDOH): HOW HARD IS IT FOR YOU TO PAY FOR THE VERY BASICS LIKE FOOD, HOUSING, MEDICAL CARE, AND HEATING?: NOT HARD AT ALL

## 2022-01-21 NOTE — PROGRESS NOTES
Chief Complaint   Patient presents with    1 Month Follow-Up    Back Pain     Mid Back pain       HPI: Ori Avila  presents for evaluation and management of back pain and elevated blood pressure. Ori Avila notes that he has been doing his home exercises as directed. His back pain is improved. He still gets some pain if he tries to squat significant weight. Pain is located over the right lower lumbar muscles. Does not radiate           Review of Systems    No Known Allergies  New Prescriptions    No medications on file     Current Outpatient Medications   Medication Sig Dispense Refill    amoxicillin-clavulanate (AUGMENTIN) 875-125 MG per tablet  (Patient not taking: Reported on 1/21/2022)      methylPREDNISolone (MEDROL DOSEPACK) 4 MG tablet Take by mouth. (Patient not taking: Reported on 1/21/2022) 1 kit 0     No current facility-administered medications for this visit. No past medical history on file. Objective   /71 (Site: Left Upper Arm, Position: Sitting, Cuff Size: Large Adult)   Pulse 55   Temp 96.4 °F (35.8 °C) (Temporal)   Ht 6' 6\" (1.981 m)   Wt 248 lb 12.8 oz (112.9 kg)   BMI 28.75 kg/m²   Wt Readings from Last 3 Encounters:   01/21/22 248 lb 12.8 oz (112.9 kg)   12/21/21 253 lb 6.4 oz (114.9 kg)   07/08/21 227 lb 9.6 oz (103.2 kg)       Physical Exam  Constitutional:       Appearance: He is well-developed. Cardiovascular:      Rate and Rhythm: Normal rate and regular rhythm. Heart sounds: No murmur heard. No friction rub. No gallop. Pulmonary:      Effort: Pulmonary effort is normal.      Breath sounds: Normal breath sounds. No wheezing or rales. Abdominal:      General: Bowel sounds are normal. There is no distension. Palpations: Abdomen is soft. There is no mass. Tenderness: There is no abdominal tenderness. Musculoskeletal:      Comments: Mild tenderness over right lumbar erector spinae   Skin:     General: Skin is warm and dry. Findings: No rash. Chemistry        Component Value Date/Time     06/06/2019 1158    K 4.7 06/06/2019 1158     06/06/2019 1158    CO2 25 06/06/2019 1158    BUN 13 06/06/2019 1158    CREATININE 0.9 06/06/2019 1158        Component Value Date/Time    CALCIUM 9.7 06/06/2019 1158    ALKPHOS 62 06/06/2019 1158    AST 21 06/06/2019 1158    ALT 33 06/06/2019 1158    BILITOT 0.5 06/06/2019 1158          Lab Results   Component Value Date    WBC 6.4 06/06/2019    HGB 14.7 06/06/2019    HCT 44.1 06/06/2019    MCV 88.1 06/06/2019     06/06/2019     No results found for: LABA1C  No results found for: EAG  No results found for: LABA1C  No components found for: CHLPL  Lab Results   Component Value Date    TRIG 122 06/06/2019     Lab Results   Component Value Date    HDL 48 06/06/2019     Lab Results   Component Value Date    LDLCALC 50 06/06/2019     Lab Results   Component Value Date    LABVLDL 24 06/06/2019         Assessment   Plan   1. Strain of lumbar region, initial encounter  Counseled on appropriate rate of recovery and weight lifting. Referred to Donald Montalvo of [facility] training for consultation on form    2. Elevated blood pressure reading in office without diagnosis of hypertension  Improved: Monitor    Discussed use, benefit, and side effects of prescribed medications. Barriers to medication compliance addressed. All patient questions answered. Pt voiced understanding. RTC Return if symptoms worsen or fail to improve.

## 2022-03-21 ENCOUNTER — OFFICE VISIT (OUTPATIENT)
Dept: PRIMARY CARE CLINIC | Age: 22
End: 2022-03-21
Payer: COMMERCIAL

## 2022-03-21 ENCOUNTER — HOSPITAL ENCOUNTER (EMERGENCY)
Age: 22
Discharge: HOME | End: 2022-03-21
Attending: EMERGENCY MEDICINE
Payer: COMMERCIAL

## 2022-03-21 VITALS
DIASTOLIC BLOOD PRESSURE: 80 MMHG | SYSTOLIC BLOOD PRESSURE: 145 MMHG | HEIGHT: 78 IN | WEIGHT: 228.06 LBS | OXYGEN SATURATION: 100 % | RESPIRATION RATE: 16 BRPM | BODY MASS INDEX: 26.39 KG/M2 | TEMPERATURE: 97.9 F | HEART RATE: 53 BPM

## 2022-03-21 VITALS
HEIGHT: 78 IN | DIASTOLIC BLOOD PRESSURE: 94 MMHG | BODY MASS INDEX: 27.81 KG/M2 | SYSTOLIC BLOOD PRESSURE: 132 MMHG | WEIGHT: 240.4 LBS | HEART RATE: 61 BPM | TEMPERATURE: 98 F

## 2022-03-21 DIAGNOSIS — Z23 NEED FOR PROPHYLACTIC VACCINATION AND INOCULATION AGAINST RABIES: ICD-10-CM

## 2022-03-21 DIAGNOSIS — S81.851A CAT BITE OF RIGHT LOWER LEG, INITIAL ENCOUNTER: Primary | ICD-10-CM
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
## 2022-03-21 PROCEDURE — 99283 EMERGENCY DEPT VISIT LOW MDM: CPT

## 2022-03-21 PROCEDURE — 90675 RABIES VACCINE IM: CPT | Performed by: EMERGENCY MEDICINE

## 2022-03-21 PROCEDURE — 96372 THER/PROPH/DIAG INJ SC/IM: CPT

## 2022-03-21 PROCEDURE — 99213 OFFICE O/P EST LOW 20 MIN: CPT | Performed by: FAMILY MEDICINE

## 2022-03-21 PROCEDURE — 90471 IMMUNIZATION ADMIN: CPT | Performed by: EMERGENCY MEDICINE

## 2022-03-21 PROCEDURE — 6360000002 HC RX W HCPCS: Performed by: EMERGENCY MEDICINE

## 2022-03-21 PROCEDURE — 90375 RABIES IG IM/SC: CPT | Performed by: EMERGENCY MEDICINE

## 2022-03-21 RX ORDER — AMOXICILLIN AND CLAVULANATE POTASSIUM 875; 125 MG/1; MG/1
1 TABLET, FILM COATED ORAL 2 TIMES DAILY
Qty: 28 TABLET | Refills: 0 | Status: SHIPPED | OUTPATIENT
Start: 2022-03-21 | End: 2022-04-04

## 2022-03-21 RX ADMIN — RABIES VIRUS STRAIN PM-1503-3M ANTIGEN (PROPIOLACTONE INACTIVATED) AND WATER 2.5 UNITS: KIT at 12:59

## 2022-03-21 RX ADMIN — RABIES IMMUNE GLOBULIN (HUMAN) 2070 UNITS: 300 INJECTION, SOLUTION INFILTRATION; INTRAMUSCULAR at 12:52

## 2022-03-21 ASSESSMENT — ENCOUNTER SYMPTOMS
VOMITING: 0
NAUSEA: 0
DIARRHEA: 0
COUGH: 0
ABDOMINAL PAIN: 0
SHORTNESS OF BREATH: 0
CONSTIPATION: 0

## 2022-03-21 NOTE — PROGRESS NOTES
normal. There is no distension. Palpations: Abdomen is soft. There is no mass. Tenderness: There is no abdominal tenderness. Skin:     General: Skin is warm and dry. Findings: No rash. Comments: Sets of linear excoriations on right leg that breaks epithelium           Chemistry        Component Value Date/Time     06/06/2019 1158    K 4.7 06/06/2019 1158     06/06/2019 1158    CO2 25 06/06/2019 1158    BUN 13 06/06/2019 1158    CREATININE 0.9 06/06/2019 1158        Component Value Date/Time    CALCIUM 9.7 06/06/2019 1158    ALKPHOS 62 06/06/2019 1158    AST 21 06/06/2019 1158    ALT 33 06/06/2019 1158    BILITOT 0.5 06/06/2019 1158          Lab Results   Component Value Date    WBC 6.4 06/06/2019    HGB 14.7 06/06/2019    HCT 44.1 06/06/2019    MCV 88.1 06/06/2019     06/06/2019     No results found for: LABA1C  No results found for: EAG  No results found for: LABA1C  No components found for: CHLPL  Lab Results   Component Value Date    TRIG 122 06/06/2019     Lab Results   Component Value Date    HDL 48 06/06/2019     Lab Results   Component Value Date    LDLCALC 50 06/06/2019     Lab Results   Component Value Date    LABVLDL 24 06/06/2019         Assessment   Plan   1. Cat bite, initial encounter  Because patient is been exposed to a feral cat bite, I think he should probably receive rabies prophylaxis. We will send him to the ER for this  - amoxicillin-clavulanate (AUGMENTIN) 875-125 MG per tablet; Take 1 tablet by mouth 2 times daily for 14 days  Dispense: 28 tablet; Refill: 0    Discussed use, benefit, and side effects of prescribed medications. Barriers to medication compliance addressed. All patient questions answered. Pt voiced understanding. RTC No follow-ups on file.

## 2022-03-21 NOTE — ED NOTES
Pt states that he was bit by a stray cat yesterday morning to right calf and pt states that his tetnus is up to date and pt was seen upstairs at [facility] and was referred to er for the rabies shot.       Holley Duron RN  03/21/22 [id number]

## 2022-03-21 NOTE — ED PROVIDER NOTES
[facility] EMERGENCY DEPT VISIT      Patient Identification  Renetta Lopez is a 19 y.o. male. Chief Complaint   Animal Bite (right calf happened yesterday morning)      History of Present Illness: This is a  19 y.o. male who presents ambulatory  to the ED with complaints of cat bite to the right calf. Patient states that he was walking out to his car yesterday and a stray cat bit him on the right calf. This was unprovoked. He does not know the owner of the cat. The cat is not in the custody of the event. The patient's tetanus is up-to-date. He does not have any significant pain around the wound site. No redness or drainage. He went to his PCP and was sent to the emergency department for rabies vaccine. History reviewed. No pertinent past medical history. Past Surgical History:   Procedure Laterality Date    HUMERUS FRACTURE SURGERY Right     2 Times    WISDOM TOOTH EXTRACTION  2018       No current facility-administered medications for this encounter. Current Outpatient Medications:     amoxicillin-clavulanate (AUGMENTIN) 875-125 MG per tablet, Take 1 tablet by mouth 2 times daily for 14 days, Disp: 28 tablet, Rfl: 0    No Known Allergies    Social History     Socioeconomic History    Marital status: Single     Spouse name: Not on file    Number of children: 0    Years of education: Not on file    Highest education level: Not on file   Occupational History    Occupation: Student/[employer]     Occupation:  at [address] [place] [address] Use    Smoking status: Never Smoker    Smokeless tobacco: Never Used   Vaping Use    Vaping Use: Never used   Substance and Sexual Activity    Alcohol use:  Yes    Drug use: Never    Sexual activity: Yes     Partners: Female     Birth control/protection: Condom   Other Topics Concern    Not on file   Social History Narrative    Not on file     Social Determinants of Health     Financial Resource Strain: Low Risk     Difficulty of Paying Living Expenses: Not hard at all   Food Insecurity: No Food Insecurity    Worried About [facility] in the Last Year: Never true    Darin of Food in the Last Year: Never true   Transportation Needs:     Lack of Transportation (Medical): Not on file    Lack of Transportation (Non-Medical): Not on file   Physical Activity:     Days of Exercise per Week: Not on file    Minutes of Exercise per Session: Not on file   Stress:     Feeling of Stress : Not on file   Social Connections:     Frequency of Communication with Friends and Family: Not on file    Frequency of Social Gatherings with Friends and Family: Not on file    Attends Catholic Services: Not on file    Active Member of [address] [place] [address] or Organizations: Not on file    Attends Club or Organization Meetings: Not on file    Marital Status: Not on file   Intimate Partner Violence:     Fear of Current or Ex-Partner: Not on file    Emotionally Abused: Not on file    Physically Abused: Not on file    Sexually Abused: Not on file   Housing Stability:     Unable to Pay for Housing in the Last Year: Not on file    Number of [place] in the Last Year: Not on file    Unstable Housing in the Last Year: Not on file       Nursing Notes Reviewed      ROS:  General: no fever  Musculoskeletal: no arthralgia, no myalgia, no back pain,  no joint swelling  NEURO: no headache, no numbness, no weakness, no dizziness  DERM: no rash, no erythema, no ecchymosis, +wounds      PHYSICAL EXAM:  GENERAL APPEARANCE: Stephen Hein is in no acute respiratory distress. Awake and alert. VITAL SIGNS:   ED Triage Vitals [03/21/22 1201]   Enc Vitals Group      BP (!) 145/80      Pulse 53      Resp 16      Temp 97.9 °F (36.6 °C)      Temp Source Oral      SpO2 100 %      Weight 228 lb 1 oz (103.4 kg)      Height 6' 6\" (1.981 m)      Head Circumference       Peak Flow       Pain Score       Pain Loc       Pain Edu? Excl. in [address]? HEAD: Normocephalic, atraumatic.   EYES: Extraocular muscles are intact. Conjunctivas are pink. Negative scleral icterus. ENT:  Mucous membranes are moist.    NECK: Nontender and supple. CHEST: Clear to auscultation bilaterally. No rales, rhonchi, or wheezing. HEART:  Regular rate and rhythm. No murmurs. Strong and equal pulses in the upper and lower extremities. MUSCULOSKELETAL:  Active range of motion of the upper and lower extremities. No edema. NEUROLOGICAL: Awake, alert and oriented x 3. Power intact in the upper and lower extremities. DERMATOLOGIC: No petechiae, rashes, or ecchymoses. Two superficial linear scratches and one small puncture right calf. No surrounding erythema or tenderness      ED COURSE AND MEDICAL DECISION MAKING:      Radiology:  All plain films have been evaluated by myself. They may have been overread by radiologist as noted in chart. Other radiologic studies (i.e. CT, MRI, ultrasounds, etc ) have been interpreted by radiologist.     No orders to display       Labs:  No results found for this visit on 03/21/22. Treatment in the department:  Patient received   Medications   rabies immune globulin (HYPERRAB) 1500 UNIT/5ML injection 2,070 Units (2,070 Units IntraMUSCular Given by Other 3/21/22 1252)   rabies vaccine,human diploid (IMOVAX) injection 2.5 Units (2.5 Units IntraMUSCular Given 3/21/22 1259)     Immunoglobulin infiltrated around puncture and scratches    Medical decision making:  Patient with cat bite unprovoked, stray. Although prevalence of rabies low in area, no way to isolate cat. Discussed risks and benefits of proceeding with rabies series and he wanted to proceed. Patient has already been given RX for prophylactic antibiotics    Clinical Impression:  1. Cat bite of right lower leg, initial encounter    2. Need for prophylactic vaccination and inoculation against rabies        Dispo:  Patient will be discharged  at this time. Patient was informed of this decision and agrees with plan.  I have discussed lab and xray findings with patient and they understand. Questions were answered to the best of my ability. Discharge vitals:  Blood pressure (!) 145/80, pulse 53, temperature 97.9 °F (36.6 °C), temperature source Oral, resp. rate 16, height 6' 6\" (1.981 m), weight 228 lb 1 oz (103.4 kg), SpO2 100 %. Prescriptions given:   Discharge Medication List as of 3/21/2022  1:01 PM            This chart was created using dragon voice recognition software.         Duncan Rios MD  03/21/22 2846

## 2022-03-21 NOTE — ED NOTES
MD at bedside injecting immune globulin around cat bite to right calf area.       Michele Bryant RN  03/21/22 6987

## 2022-03-22 PROBLEM — Z20.3 RABIES EXPOSURE: Status: ACTIVE | Noted: 2022-03-22

## 2022-03-22 RX ORDER — SODIUM CHLORIDE 9 MG/ML
INJECTION, SOLUTION INTRAVENOUS CONTINUOUS
Status: CANCELLED | OUTPATIENT
Start: 2022-03-22

## 2022-03-22 RX ORDER — ONDANSETRON 2 MG/ML
8 INJECTION INTRAMUSCULAR; INTRAVENOUS
Status: CANCELLED | OUTPATIENT
Start: 2022-03-22

## 2022-03-22 RX ORDER — ACETAMINOPHEN 325 MG/1
650 TABLET ORAL
Status: CANCELLED | OUTPATIENT
Start: 2022-03-22

## 2022-03-22 RX ORDER — DIPHENHYDRAMINE HYDROCHLORIDE 50 MG/ML
50 INJECTION INTRAMUSCULAR; INTRAVENOUS
Status: CANCELLED | OUTPATIENT
Start: 2022-03-22

## 2022-03-24 ENCOUNTER — HOSPITAL ENCOUNTER (OUTPATIENT)
Dept: ONCOLOGY | Age: 22
Setting detail: INFUSION SERIES
Discharge: HOME | End: 2022-03-24
Payer: COMMERCIAL

## 2022-03-24 VITALS
SYSTOLIC BLOOD PRESSURE: 127 MMHG | TEMPERATURE: 98.7 F | DIASTOLIC BLOOD PRESSURE: 65 MMHG | OXYGEN SATURATION: 99 % | RESPIRATION RATE: 16 BRPM | HEART RATE: 54 BPM

## 2022-03-24 DIAGNOSIS — Z20.3 RABIES EXPOSURE: Primary | ICD-10-CM

## 2022-03-24 PROCEDURE — 90675 RABIES VACCINE IM: CPT | Performed by: EMERGENCY MEDICINE

## 2022-03-24 PROCEDURE — 90471 IMMUNIZATION ADMIN: CPT | Performed by: EMERGENCY MEDICINE

## 2022-03-24 PROCEDURE — 6360000002 HC RX W HCPCS: Performed by: EMERGENCY MEDICINE

## 2022-03-24 PROCEDURE — 96372 THER/PROPH/DIAG INJ SC/IM: CPT

## 2022-03-24 RX ORDER — SODIUM CHLORIDE 9 MG/ML
INJECTION, SOLUTION INTRAVENOUS CONTINUOUS
Status: CANCELLED | OUTPATIENT
Start: 2022-03-26

## 2022-03-24 RX ORDER — DIPHENHYDRAMINE HYDROCHLORIDE 50 MG/ML
50 INJECTION INTRAMUSCULAR; INTRAVENOUS
Status: CANCELLED | OUTPATIENT
Start: 2022-03-26

## 2022-03-24 RX ORDER — ACETAMINOPHEN 325 MG/1
650 TABLET ORAL
Status: CANCELLED | OUTPATIENT
Start: 2022-03-26

## 2022-03-24 RX ORDER — ONDANSETRON 2 MG/ML
8 INJECTION INTRAMUSCULAR; INTRAVENOUS
Status: CANCELLED | OUTPATIENT
Start: 2022-03-26

## 2022-03-24 RX ADMIN — RABIES VIRUS STRAIN PM-1503-3M ANTIGEN (PROPIOLACTONE INACTIVATED) AND WATER 2.5 UNITS: KIT at 12:35

## 2022-03-24 NOTE — PROGRESS NOTES
Pt seen and assessed [facility] today for IMOVAX injection per orders from Dr. Pedro Aaron. Pt tolerated infusion well and without incident. Pt verbalizes understanding of discharge instructions. Discharged ambulatory to home with self. Electronically signed by Parris Montoya RN on 3/24/2022 at 12:42 PM

## 2022-03-28 ENCOUNTER — HOSPITAL ENCOUNTER (OUTPATIENT)
Dept: ONCOLOGY | Age: 22
Setting detail: INFUSION SERIES
Discharge: HOME | End: 2022-03-28
Payer: COMMERCIAL

## 2022-03-28 VITALS
TEMPERATURE: 98.4 F | OXYGEN SATURATION: 99 % | SYSTOLIC BLOOD PRESSURE: 128 MMHG | RESPIRATION RATE: 18 BRPM | DIASTOLIC BLOOD PRESSURE: 64 MMHG | HEART RATE: 52 BPM

## 2022-03-28 DIAGNOSIS — Z20.3 RABIES EXPOSURE: Primary | ICD-10-CM

## 2022-03-28 PROCEDURE — 6360000002 HC RX W HCPCS: Performed by: EMERGENCY MEDICINE

## 2022-03-28 PROCEDURE — 90471 IMMUNIZATION ADMIN: CPT | Performed by: EMERGENCY MEDICINE

## 2022-03-28 PROCEDURE — 90675 RABIES VACCINE IM: CPT | Performed by: EMERGENCY MEDICINE

## 2022-03-28 PROCEDURE — 96372 THER/PROPH/DIAG INJ SC/IM: CPT

## 2022-03-28 RX ORDER — SODIUM CHLORIDE 9 MG/ML
INJECTION, SOLUTION INTRAVENOUS CONTINUOUS
OUTPATIENT
Start: 2022-03-30

## 2022-03-28 RX ORDER — DIPHENHYDRAMINE HYDROCHLORIDE 50 MG/ML
50 INJECTION INTRAMUSCULAR; INTRAVENOUS
OUTPATIENT
Start: 2022-03-30

## 2022-03-28 RX ORDER — ACETAMINOPHEN 325 MG/1
650 TABLET ORAL
OUTPATIENT
Start: 2022-03-30

## 2022-03-28 RX ORDER — ONDANSETRON 2 MG/ML
8 INJECTION INTRAMUSCULAR; INTRAVENOUS
OUTPATIENT
Start: 2022-03-30

## 2022-03-28 RX ADMIN — RABIES VIRUS STRAIN PM-1503-3M ANTIGEN (PROPIOLACTONE INACTIVATED) AND WATER 2.5 UNITS: KIT at 12:39

## 2022-03-28 NOTE — PROGRESS NOTES
Pt seen and assessed [facility] today for IMOVAX injection per orders from Dr. Monique Ortiz. Pt tolerated infusion well and without incident. Pt verbalizes understanding of discharge instructions. Discharged ambulatory to home with self. Electronically signed by Canelo Lopez RN on 3/28/2022 at 12:49 PM

## 2022-04-04 ENCOUNTER — HOSPITAL ENCOUNTER (OUTPATIENT)
Dept: ONCOLOGY | Age: 22
Setting detail: INFUSION SERIES
Discharge: HOME | End: 2022-04-04
Payer: COMMERCIAL

## 2022-04-04 VITALS
TEMPERATURE: 99.6 F | SYSTOLIC BLOOD PRESSURE: 112 MMHG | HEART RATE: 52 BPM | OXYGEN SATURATION: 97 % | RESPIRATION RATE: 18 BRPM | DIASTOLIC BLOOD PRESSURE: 65 MMHG

## 2022-04-04 DIAGNOSIS — Z20.3 RABIES EXPOSURE: Primary | ICD-10-CM

## 2022-04-04 PROCEDURE — 90675 RABIES VACCINE IM: CPT | Performed by: EMERGENCY MEDICINE

## 2022-04-04 PROCEDURE — 6360000002 HC RX W HCPCS: Performed by: EMERGENCY MEDICINE

## 2022-04-04 PROCEDURE — 90471 IMMUNIZATION ADMIN: CPT | Performed by: EMERGENCY MEDICINE

## 2022-04-04 RX ORDER — ONDANSETRON 2 MG/ML
8 INJECTION INTRAMUSCULAR; INTRAVENOUS
OUTPATIENT
Start: 2022-04-06

## 2022-04-04 RX ORDER — SODIUM CHLORIDE 9 MG/ML
INJECTION, SOLUTION INTRAVENOUS CONTINUOUS
OUTPATIENT
Start: 2022-04-06

## 2022-04-04 RX ORDER — ACETAMINOPHEN 325 MG/1
650 TABLET ORAL
OUTPATIENT
Start: 2022-04-06

## 2022-04-04 RX ORDER — DIPHENHYDRAMINE HYDROCHLORIDE 50 MG/ML
50 INJECTION INTRAMUSCULAR; INTRAVENOUS
OUTPATIENT
Start: 2022-04-06

## 2022-04-04 RX ADMIN — RABIES VIRUS STRAIN PM-1503-3M ANTIGEN (PROPIOLACTONE INACTIVATED) AND WATER 2.5 UNITS: KIT at 13:09

## 2022-04-04 NOTE — PROGRESS NOTES
Pt seen and assessed [facility] today for IMOVAX injection per orders from Dr. Mary Mendoza. Pt tolerated infusion well and without incident. Pt verbalizes understanding of discharge instructions. Discharged ambulatory to home with self. Electronically signed by Khurram Owens RN on 4/4/2022 at 1:15 PM

## 2022-10-19 ENCOUNTER — OFFICE VISIT (OUTPATIENT)
Dept: PRIMARY CARE CLINIC | Age: 22
End: 2022-10-19
Payer: COMMERCIAL

## 2022-10-19 ENCOUNTER — HOSPITAL ENCOUNTER (OUTPATIENT)
Dept: GENERAL RADIOLOGY | Age: 22
Discharge: HOME | End: 2022-10-19
Payer: COMMERCIAL

## 2022-10-19 VITALS
OXYGEN SATURATION: 99 % | HEART RATE: 64 BPM | WEIGHT: 246 LBS | HEIGHT: 78 IN | SYSTOLIC BLOOD PRESSURE: 134 MMHG | DIASTOLIC BLOOD PRESSURE: 76 MMHG | BODY MASS INDEX: 28.46 KG/M2 | TEMPERATURE: 97.5 F

## 2022-10-19 DIAGNOSIS — S39.012A STRAIN OF LUMBAR REGION, INITIAL ENCOUNTER: Primary | ICD-10-CM

## 2022-10-19 DIAGNOSIS — S39.012A STRAIN OF LUMBAR REGION, INITIAL ENCOUNTER: ICD-10-CM

## 2022-10-19 PROCEDURE — 99213 OFFICE O/P EST LOW 20 MIN: CPT | Performed by: FAMILY MEDICINE

## 2022-10-19 PROCEDURE — 72100 X-RAY EXAM L-S SPINE 2/3 VWS: CPT

## 2022-10-19 RX ORDER — CYCLOBENZAPRINE HCL 10 MG
10 TABLET ORAL 3 TIMES DAILY PRN
Qty: 20 TABLET | Refills: 0 | Status: SHIPPED | OUTPATIENT
Start: 2022-10-19 | End: 2022-10-29

## 2022-10-19 ASSESSMENT — PATIENT HEALTH QUESTIONNAIRE - PHQ9
SUM OF ALL RESPONSES TO PHQ9 QUESTIONS 1 & 2: 0
1. LITTLE INTEREST OR PLEASURE IN DOING THINGS: 0
SUM OF ALL RESPONSES TO PHQ QUESTIONS 1-9: 0
2. FEELING DOWN, DEPRESSED OR HOPELESS: 0
SUM OF ALL RESPONSES TO PHQ QUESTIONS 1-9: 0

## 2022-10-19 NOTE — PATIENT INSTRUCTIONS
Freeze several bennett cups full of water    About an hour before bedtime take your flexeril,     Then use the ice 20 minutes directly on your back where your hurt. It will feel cold, then burn, then go numb and finally get doughy. Then go to sleep.

## 2022-10-19 NOTE — PROGRESS NOTES
Chief Complaint   Patient presents with    Lower Back Pain       HPI: Krystal Espana  presents for evaluation and management of Krystal Espana notes 6-week history of right low back pain similar to what he had about a year ago. He states it began insidiously and he is having about 1-2 out of 10 pain when he is just seated but if he jogs or loads his spine with axial weight it goes up to a 8 out of 10 in severity. He is an avid weightlifter but notes no acute injury. Pain does not radiate down his legs or up his back or abdomen or shoulder. Today's PHQ:    PHQ Scores 10/19/2022 12/21/2021 7/8/2021 10/12/2020 5/21/2019   PHQ2 Score 0 0 0 0 0   PHQ9 Score 0 0 0 0 0     Interpretation of Total Score Depression Severity: 1-4 = Minimal depression, 5-9 = Mild depression, 10-14 = Moderate depression, 15-19 = Moderately severe depression, 20-27 = Severe depression        Review of Systems    No Known Allergies  New Prescriptions    CYCLOBENZAPRINE (FLEXERIL) 10 MG TABLET    Take 1 tablet by mouth 3 times daily as needed for Muscle spasms best if taken one hour before bedtime then Ice your lower back for 20 min     Current Outpatient Medications   Medication Sig Dispense Refill    cyclobenzaprine (FLEXERIL) 10 MG tablet Take 1 tablet by mouth 3 times daily as needed for Muscle spasms best if taken one hour before bedtime then Ice your lower back for 20 min 20 tablet 0     No current facility-administered medications for this visit. No past medical history on file. Objective   /76   Pulse 64   Temp 97.5 °F (36.4 °C)   Ht 6' 6\" (1.981 m)   Wt 246 lb (111.6 kg)   SpO2 99%   BMI 28.43 kg/m²   Wt Readings from Last 3 Encounters:   10/19/22 246 lb (111.6 kg)   03/21/22 228 lb 1 oz (103.4 kg)   03/21/22 240 lb 6.4 oz (109 kg)       Physical Exam  Constitutional:       Appearance: He is well-developed. Cardiovascular:      Rate and Rhythm: Normal rate and regular rhythm.       Pulses:           Dorsalis pedis pulses are 2+ on the right side and 2+ on the left side. Posterior tibial pulses are 2+ on the right side and 2+ on the left side. Heart sounds: No murmur heard. No friction rub. No gallop. Comments: No Lower Extremity Edema  Pulmonary:      Effort: Pulmonary effort is normal.      Breath sounds: Normal breath sounds. No wheezing or rales. Abdominal:      General: Bowel sounds are normal. There is no distension. Palpations: Abdomen is soft. There is no mass. Tenderness: There is no abdominal tenderness. Musculoskeletal:      Comments: Tenderness of right lower paraspinal musculature down to sacroiliac region  Negative straight leg raise bilaterally, motor strength is 5 out of 5 bilateral lower extremity proximally distally. There is a compressible 1 cm area on the right medial leg that is normal skin color. Skin:     General: Skin is warm and dry. Findings: No rash. Chemistry        Component Value Date/Time     06/06/2019 1158    K 4.7 06/06/2019 1158     06/06/2019 1158    CO2 25 06/06/2019 1158    BUN 13 06/06/2019 1158    CREATININE 0.9 06/06/2019 1158        Component Value Date/Time    CALCIUM 9.7 06/06/2019 1158    ALKPHOS 62 06/06/2019 1158    AST 21 06/06/2019 1158    ALT 33 06/06/2019 1158    BILITOT 0.5 06/06/2019 1158          Lab Results   Component Value Date    WBC 6.4 06/06/2019    HGB 14.7 06/06/2019    HCT 44.1 06/06/2019    MCV 88.1 06/06/2019     06/06/2019     No results found for: LABA1C  No results found for: EAG  No results found for: LABA1C  No components found for: CHLPL  Lab Results   Component Value Date    TRIG 122 06/06/2019     Lab Results   Component Value Date    HDL 48 06/06/2019     Lab Results   Component Value Date    LDLCALC 50 06/06/2019     Lab Results   Component Value Date    LABVLDL 24 06/06/2019         Assessment   Plan   1.  Strain of lumbar region, initial encounter  We will trial Flexeril and ice and home exercise program and follow-up in 6 weeks. We will check x-ray given duration of pain and pain with axial load  - cyclobenzaprine (FLEXERIL) 10 MG tablet; Take 1 tablet by mouth 3 times daily as needed for Muscle spasms best if taken one hour before bedtime then Ice your lower back for 20 min  Dispense: 20 tablet; Refill: 0  - XR LUMBAR SPINE (2-3 VIEWS); Future        RTC Return in about 6 weeks (around 11/30/2022).

## 2022-10-19 NOTE — RESULT ENCOUNTER NOTE
Good Morning Chaparro ,    Happy to report that your x-ray looked okay today. Do your home exercise program and I will see back in 6 weeks, come back sooner if you are feeling worse.     I hope you are feeling better,    Dr. Olena Veliz

## 2024-05-16 ENCOUNTER — APPOINTMENT (OUTPATIENT)
Dept: CT IMAGING | Age: 24
End: 2024-05-16
Payer: COMMERCIAL

## 2024-05-16 ENCOUNTER — HOSPITAL ENCOUNTER (EMERGENCY)
Age: 24
Discharge: HOME | End: 2024-05-16
Attending: EMERGENCY MEDICINE
Payer: COMMERCIAL

## 2024-05-16 VITALS
HEART RATE: 62 BPM | WEIGHT: 241.2 LBS | OXYGEN SATURATION: 97 % | BODY MASS INDEX: 27.91 KG/M2 | DIASTOLIC BLOOD PRESSURE: 70 MMHG | SYSTOLIC BLOOD PRESSURE: 156 MMHG | TEMPERATURE: 98.6 F | RESPIRATION RATE: 14 BRPM | HEIGHT: 78 IN
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
## 2024-05-16 DIAGNOSIS — S00.83XA CONTUSION OF FACE, INITIAL ENCOUNTER: ICD-10-CM

## 2024-05-16 DIAGNOSIS — S09.90XA CLOSED HEAD INJURY, INITIAL ENCOUNTER: Primary | ICD-10-CM

## 2024-05-16 PROCEDURE — 70450 CT HEAD/BRAIN W/O DYE: CPT

## 2024-05-16 PROCEDURE — 99284 EMERGENCY DEPT VISIT MOD MDM: CPT

## 2024-05-16 PROCEDURE — 72125 CT NECK SPINE W/O DYE: CPT

## 2024-05-16 RX ORDER — HEPARIN SODIUM 200 [USP'U]/100ML
INJECTION, SOLUTION INTRAVENOUS CONTINUOUS
Status: CANCELLED | OUTPATIENT
Start: 2024-05-16

## 2024-05-16 ASSESSMENT — ENCOUNTER SYMPTOMS
ANAL BLEEDING: 0
FACIAL SWELLING: 0
TROUBLE SWALLOWING: 0
SORE THROAT: 0
EYE ITCHING: 0
EYE PAIN: 0
BACK PAIN: 0
VOICE CHANGE: 0
EYE REDNESS: 0
STRIDOR: 0
SHORTNESS OF BREATH: 0
CHEST TIGHTNESS: 0
EYE DISCHARGE: 0
WHEEZING: 0
ABDOMINAL DISTENTION: 0
VOMITING: 0
COUGH: 0
PHOTOPHOBIA: 0
SINUS PRESSURE: 0
ABDOMINAL PAIN: 0
RHINORRHEA: 0
CONSTIPATION: 0
BLOOD IN STOOL: 0
DIARRHEA: 0

## 2024-05-16 ASSESSMENT — PAIN SCALES - GENERAL: PAINLEVEL_OUTOF10: 4

## 2024-05-16 ASSESSMENT — PAIN DESCRIPTION - PAIN TYPE: TYPE: ACUTE PAIN

## 2024-05-16 ASSESSMENT — PAIN DESCRIPTION - DESCRIPTORS: DESCRIPTORS: ACHING

## 2024-05-16 ASSESSMENT — PAIN - FUNCTIONAL ASSESSMENT: PAIN_FUNCTIONAL_ASSESSMENT: 0-10

## 2024-05-16 ASSESSMENT — PAIN DESCRIPTION - LOCATION: LOCATION: NECK

## 2024-05-16 NOTE — DISCHARGE INSTRUCTIONS
Okay to take Tylenol and/or Ibuprofen as needed, as directed for pain.   Brain rest: Off work, no TV, computers, cell phones, loud music x3 days.   Make sure your are rechecked by Primary Care Provider before returning to your regular activities including working.

## 2024-05-16 NOTE — ED PROVIDER NOTES
\"I'm concussed\". Chaparro Arias is a generally healthy 23 year old male who presents to the ED approximately one hour after falling at the gym. He was working with a weighted cable mechanism and the cable snapped, causing him to fall forward and hit his forehead on the floor, which was a hard rubberized surface. He denies LOC but reports that he was \"dazed\" for several minutes following the injury. He reports left lateral neck pain and feeling \"mentally foggy\". 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 He has a mild frontal headache. Analgesia is offered but he declined. His NIH stroke score is 0.      BP (!) 156/70   Pulse 62   Temp 98.6 °F (37 °C) (Oral)   Resp 14   Ht 1.981 m (6' 6\")   Wt 109.4 kg (241 lb 3.2 oz)   SpO2 97%   BMI 27.87 kg/m²     I have reviewed the following from the nursing documentation:      Prior to Admission medications    Not on File       Allergies as of 05/16/2024    (No Known Allergies)       History reviewed. No pertinent past medical history.     Surgical History:   Past Surgical History:   Procedure Laterality Date    HUMERUS FRACTURE SURGERY Right     2 Times    WISDOM TOOTH EXTRACTION  2018        Family History:    Family History   Problem Relation Age of Onset    No Known Problems Mother     Hypertension Father     No Known Problems Brother     Other Brother         Heart Murmur       Social History     Socioeconomic History    Marital status: Single     Spouse name: Not on file    Number of children: 0    Years of education: Not on file    Highest education level: Not on file   Occupational History    Occupation: Student/[employer]     Occupation:  at [employer]   Tobacco Use    Smoking status: Never    Smokeless tobacco: Never   Vaping Use    Vaping Use: Never used   Substance and Sexual Activity    Alcohol use: Yes    Drug use: Never    Sexual activity: Yes     Partners: Female     Birth control/protection: Condom   Other

## 2024-05-20 ENCOUNTER — OFFICE VISIT (OUTPATIENT)
Dept: INTERNAL MEDICINE CLINIC | Age: 24
End: 2024-05-20
Payer: COMMERCIAL

## 2024-05-20 VITALS
TEMPERATURE: 98.4 F | HEIGHT: 78 IN | RESPIRATION RATE: 18 BRPM | OXYGEN SATURATION: 96 % | BODY MASS INDEX: 28.33 KG/M2 | WEIGHT: 244.9 LBS | HEART RATE: 59 BPM | SYSTOLIC BLOOD PRESSURE: 123 MMHG | DIASTOLIC BLOOD PRESSURE: 74 MMHG

## 2024-05-20 DIAGNOSIS — S09.90XA INJURY OF HEAD, INITIAL ENCOUNTER: ICD-10-CM

## 2024-05-20 DIAGNOSIS — Z00.00 HEALTHCARE MAINTENANCE: ICD-10-CM

## 2024-05-20 DIAGNOSIS — Z00.00 ENCOUNTER FOR MEDICAL EXAMINATION TO ESTABLISH CARE: ICD-10-CM

## 2024-05-20 PROCEDURE — 99213 OFFICE O/P EST LOW 20 MIN: CPT | Performed by: STUDENT IN AN ORGANIZED HEALTH CARE EDUCATION/TRAINING PROGRAM

## 2024-05-20 ASSESSMENT — ENCOUNTER SYMPTOMS
SHORTNESS OF BREATH: 0
ABDOMINAL DISTENTION: 0
RHINORRHEA: 0

## 2024-05-20 ASSESSMENT — PATIENT HEALTH QUESTIONNAIRE - PHQ9
SUM OF ALL RESPONSES TO PHQ QUESTIONS 1-9: 0
1. LITTLE INTEREST OR PLEASURE IN DOING THINGS: NOT AT ALL
SUM OF ALL RESPONSES TO PHQ9 QUESTIONS 1 & 2: 0
SUM OF ALL RESPONSES TO PHQ QUESTIONS 1-9: 0
2. FEELING DOWN, DEPRESSED OR HOPELESS: NOT AT ALL
SUM OF ALL RESPONSES TO PHQ QUESTIONS 1-9: 0
SUM OF ALL RESPONSES TO PHQ QUESTIONS 1-9: 0

## 2024-05-20 NOTE — PROGRESS NOTES
[facility]    Chaparro Arias is a 23 y.o. male, here for evaluation of the following concerns: ED f/u    HPI  23 year old male with no PMH he states that about 4 days ago he was doing crunches with a cable at the gym and the cable snapped and he fell forward and hit his head. He went to the ED. CTH and CT cervical spine were unremarkable. He states that he feels fine today. He denies any headache, dizziness, light headedness, weakness. He states that he would also like to establish care as his PCP moved to [place]. He denies any other medical problems, does not take any medications other than multivitamin and creatine. He works as a , he denies smoking. He states that he drinks alcohol only on the weekends when he drinks about 7-8 cocktails/night. He exercises about 3-4x/week. He states that he has some residual low back pain from an injury a couple of years ago. He states that the thing that makes it better is moving and staying active.     Review of Systems   Constitutional:  Negative for appetite change and unexpected weight change.   HENT:  Negative for rhinorrhea.    Respiratory:  Negative for shortness of breath.    Cardiovascular:  Negative for chest pain.   Gastrointestinal:  Negative for abdominal distention.   Genitourinary:  Negative for difficulty urinating.   Neurological:  Negative for dizziness, light-headedness and numbness.       MEDICATIONS:  Prior to Visit Medications    Not on File        Vitals:    05/20/24 1349   BP: 123/74   Site: Left Upper Arm   Position: Sitting   Cuff Size: Medium Adult   Pulse: 59   Resp: 18   Temp: 98.4 °F (36.9 °C)   TempSrc: Temporal   SpO2: 96%   Weight: 111.1 kg (244 lb 14.4 oz)   Height: 1.981 m (6' 6\")      Estimated body mass index is 28.3 kg/m² as calculated from the following:    Height as of this encounter: 1.981 m (6' 6\").    Weight as of this encounter: 111.1 kg (244 lb 14.4 oz).  Physical

## 2024-05-20 NOTE — PATIENT INSTRUCTIONS
Please get blood work done, fasting is better.  Please make an appointment for one year or if you need something else you can make an appointment.